# Patient Record
Sex: MALE | Race: WHITE | Employment: FULL TIME | URBAN - METROPOLITAN AREA
[De-identification: names, ages, dates, MRNs, and addresses within clinical notes are randomized per-mention and may not be internally consistent; named-entity substitution may affect disease eponyms.]

---

## 2024-10-20 ENCOUNTER — HOSPITAL ENCOUNTER (EMERGENCY)
Facility: HOSPITAL | Age: 29
Discharge: HOME | End: 2024-10-20
Attending: EMERGENCY MEDICINE
Payer: COMMERCIAL

## 2024-10-20 VITALS
SYSTOLIC BLOOD PRESSURE: 120 MMHG | WEIGHT: 186.94 LBS | OXYGEN SATURATION: 98 % | TEMPERATURE: 99 F | DIASTOLIC BLOOD PRESSURE: 70 MMHG | HEART RATE: 75 BPM | RESPIRATION RATE: 18 BRPM

## 2024-10-20 DIAGNOSIS — S61.214A LACERATION OF RIGHT RING FINGER WITHOUT FOREIGN BODY WITHOUT DAMAGE TO NAIL, INITIAL ENCOUNTER: Primary | ICD-10-CM

## 2024-10-20 PROCEDURE — 12001 RPR S/N/AX/GEN/TRNK 2.5CM/<: CPT

## 2024-10-20 PROCEDURE — 99283 EMERGENCY DEPT VISIT LOW MDM: CPT

## 2024-10-20 PROCEDURE — 99282 EMERGENCY DEPT VISIT SF MDM: CPT

## 2024-10-20 RX ORDER — LISDEXAMFETAMINE DIMESYLATE 30 MG/1
30 CAPSULE ORAL DAILY
COMMUNITY
Start: 2024-10-05 | End: 2024-11-04

## 2024-10-20 NOTE — ED PROVIDER NOTES
Patient Seen in: [facility] Emergency Department      History     Chief Complaint   Patient presents with    Laceration/Abrasion     Stated Complaint: laceration    Subjective:   HPI    Broken bowl in the sink with pumping blood went to urgent care directed over to ER    Objective:     Past Medical History:    ADHD    Allergic rhinitis    ALLERGIC RHINITUS    ASTHMA    Asthma (HCC)              History reviewed. No pertinent surgical history.             Social History     Socioeconomic History    Marital status: Single   Tobacco Use    Smoking status: Never   Substance and Sexual Activity    Alcohol use: Yes    Drug use: Yes     Types: Cannabis     Social Drivers of Health     Food Insecurity: Low Risk  (6/16/2022)    Received from [facility]    Food Insecurity     Have there been times that your food ran out, and you didn't have money to get more?: No     Are there times that you worry that this might happen?: No   Transportation Needs: Low Risk  (6/16/2022)    Received from [facility]    Transportation Needs     Do you have trouble getting transportation to medical appointments?: No     How do you normally get to and from your appointments?: Other   Social Connections: Low Risk  (5/25/2021)    Received from [facility]    Social Connections     Do you have someone you could call for help if needed?: Yes   Housing Stability: Low Risk  (6/16/2022)    Received from [facility]    Housing Stability     Are you concerned about having a safe and reliable place to live?: No                  Physical Exam     ED Triage Vitals [10/20/24 1126]   /64   Pulse 70   Resp 17   Temp 98.5 °F (36.9 °C)   Temp src Temporal   SpO2 98 %   O2 Device None (Room air)       Current Vitals:   Vital Signs  BP: 105/64  Pulse: 70  Resp: 17  Temp: 98.5 °F (36.9 °C)  Temp src: Temporal    Oxygen Therapy  SpO2: 98 %  O2 Device: None (Room  air)        Physical Exam  ***      ED Course   Labs Reviewed - No data to display       Tourniquet placed, digital block placed, 4 interrupted sutures  ***       MDM      ***        MDM    Disposition and Plan     Clinical Impression:  No diagnosis found.     Disposition:  There is no disposition on file for this visit.  There is no disposition time on file for this visit.    Follow-up:  No follow-up provider specified.        Medications Prescribed:  Current Discharge Medication List              Supplementary Documentation:                                                          removal          Medications Prescribed:  Discharge Medication List as of 10/20/2024  1:06 PM              Supplementary Documentation:

## 2024-10-20 NOTE — ED INITIAL ASSESSMENT (HPI)
Patient presenting with laceration on right hand 4th finger, patient was washing dishes and a bowl broke. Patient went to IC and was told to go to ED because \"they was to much blood\"

## 2024-10-20 NOTE — ED QUICK NOTES
Pt aware about his suture removal and where to get it removed. Verbalizing understanding of his discharge instructions. Dressing dry and intact.

## 2024-10-20 NOTE — DISCHARGE INSTRUCTIONS
Sutures will need to be removed in 12 days please present to the emergency department you have 5 sutures in your finger that will need to be removed.    Please use soap and water and clean hand twice daily

## 2024-11-03 ENCOUNTER — HOSPITAL ENCOUNTER (EMERGENCY)
Facility: HOSPITAL | Age: 29
Discharge: HOME | End: 2024-11-03
Attending: EMERGENCY MEDICINE
Payer: COMMERCIAL

## 2024-11-03 VITALS
HEIGHT: 67 IN | SYSTOLIC BLOOD PRESSURE: 106 MMHG | TEMPERATURE: 98 F | BODY MASS INDEX: 28.25 KG/M2 | WEIGHT: 180 LBS | OXYGEN SATURATION: 98 % | HEART RATE: 80 BPM | RESPIRATION RATE: 14 BRPM | DIASTOLIC BLOOD PRESSURE: 70 MMHG

## 2024-11-03 DIAGNOSIS — Z48.02 ENCOUNTER FOR REMOVAL OF SUTURES: Primary | ICD-10-CM

## 2024-11-03 NOTE — ED PROVIDER NOTES
Patient Seen in: [facility] Emergency Department      History     Chief Complaint   Patient presents with    Suture Removal     Stated Complaint: suture removal    Subjective:   HPI    28-year-old gentleman here for evaluation of suture removal from right fourth digit, laceration sustained 2 weeks ago, wound healing well reports some residual tingling to the fingertip no other complaints or concerns.    Objective:     Past Medical History:    ADHD    Allergic rhinitis    ALLERGIC RHINITUS    ASTHMA    Asthma (HCC)              History reviewed. No pertinent surgical history.             Social History     Socioeconomic History    Marital status: Single   Tobacco Use    Smoking status: Never    Smokeless tobacco: Never   Vaping Use    Vaping status: Never Used   Substance and Sexual Activity    Alcohol use: Yes    Drug use: Yes     Types: Cannabis     Social Drivers of Health     Food Insecurity: Low Risk  (6/16/2022)    Received from [facility]    Food Insecurity     Have there been times that your food ran out, and you didn't have money to get more?: No     Are there times that you worry that this might happen?: No   Transportation Needs: Low Risk  (6/16/2022)    Received from [facility]    Transportation Needs     Do you have trouble getting transportation to medical appointments?: No     How do you normally get to and from your appointments?: Other   Social Connections: Low Risk  (5/25/2021)    Received from [facility]    Social Connections     Do you have someone you could call for help if needed?: Yes   Housing Stability: Low Risk  (6/16/2022)    Received from [facility]    Housing Stability     Are you concerned about having a safe and reliable place to live?: No                  Physical Exam     ED Triage Vitals [11/03/24 1156]   /70   Pulse 80   Resp 14   Temp 98 °F (36.7 °C)   Temp src Temporal   SpO2 98 %    O2 Device None (Room air)       Current Vitals:   Vital Signs  BP: 106/70  Pulse: 80  Resp: 14  Temp: 98 °F (36.7 °C)  Temp src: Temporal    Oxygen Therapy  SpO2: 98 %  O2 Device: None (Room air)        Physical Exam  Right fourth digit with 2 cm laceration of the volar aspect over the PIP joint, mild swelling no drainage no significant erythema noted    ED Course   Labs Reviewed - No data to display                MDM      Sutures removed without complication, discussed that peripheral tingling in the fingertip may improve over time follow-up with PMD return precautions discussed patient agrees with plan        Medical Decision Making      Disposition and Plan     Clinical Impression:  1. Encounter for removal of sutures         Disposition:  Discharge  11/3/2024 12:42 pm    Follow-up:  Macy Muse DO  [address] [facility]  [address] [place] [id number] [phone]    Follow up  Follow-up with your PMD for reevaluation in 24 to 48 hours.  Return to ER if symptoms worsen or change or if any other new concerns.          Medications Prescribed:  Current Discharge Medication List              Supplementary Documentation:

## 2024-11-03 NOTE — ED INITIAL ASSESSMENT (HPI)
PT requesting R 4th finger stitch removal. He states the stitches were placed 2 weeks ago. He reports some swelling from the site. Denies fever or drainage.